# Patient Record
Sex: FEMALE | Race: WHITE | Employment: UNEMPLOYED | URBAN - METROPOLITAN AREA
[De-identification: names, ages, dates, MRNs, and addresses within clinical notes are randomized per-mention and may not be internally consistent; named-entity substitution may affect disease eponyms.]

---

## 2020-02-18 ENCOUNTER — HOSPITAL ENCOUNTER (INPATIENT)
Age: 65
LOS: 1 days | Discharge: HOME | DRG: 310 | End: 2020-02-19
Attending: EMERGENCY MEDICINE | Admitting: INTERNAL MEDICINE
Payer: COMMERCIAL

## 2020-02-18 ENCOUNTER — APPOINTMENT (OUTPATIENT)
Dept: GENERAL RADIOLOGY | Age: 65
DRG: 310 | End: 2020-02-18
Payer: COMMERCIAL

## 2020-02-18 ENCOUNTER — APPOINTMENT (OUTPATIENT)
Dept: ULTRASOUND IMAGING | Age: 65
DRG: 310 | End: 2020-02-18
Payer: COMMERCIAL

## 2020-02-18 PROBLEM — I47.10 PSVT (PAROXYSMAL SUPRAVENTRICULAR TACHYCARDIA): Status: ACTIVE | Noted: 2020-02-18

## 2020-02-18 PROBLEM — I48.91 NEW ONSET ATRIAL FIBRILLATION (HCC): Status: ACTIVE | Noted: 2020-02-18

## 2020-02-18 PROBLEM — I47.1 PSVT (PAROXYSMAL SUPRAVENTRICULAR TACHYCARDIA) (HCC): Status: ACTIVE | Noted: 2020-02-18

## 2020-02-18 LAB
A/G RATIO: 1.8 (ref 1.1–2.2)
ALBUMIN SERPL-MCNC: 4.6 G/DL (ref 3.4–5)
ALP BLD-CCNC: 40 U/L (ref 40–129)
ALT SERPL-CCNC: 11 U/L (ref 10–40)
ANION GAP SERPL CALCULATED.3IONS-SCNC: 16 MMOL/L (ref 3–16)
AST SERPL-CCNC: 20 U/L (ref 15–37)
BASOPHILS ABSOLUTE: 0.1 K/UL (ref 0–0.2)
BASOPHILS RELATIVE PERCENT: 0.8 %
BILIRUB SERPL-MCNC: 0.3 MG/DL (ref 0–1)
BUN BLDV-MCNC: 11 MG/DL (ref 7–20)
CALCIUM SERPL-MCNC: 9.4 MG/DL (ref 8.3–10.6)
CHLORIDE BLD-SCNC: 101 MMOL/L (ref 99–110)
CO2: 22 MMOL/L (ref 21–32)
CREAT SERPL-MCNC: 1.2 MG/DL (ref 0.6–1.2)
EKG ATRIAL RATE: 150 BPM
EKG DIAGNOSIS: NORMAL
EKG P AXIS: 74 DEGREES
EKG Q-T INTERVAL: 292 MS
EKG QRS DURATION: 62 MS
EKG QTC CALCULATION (BAZETT): 444 MS
EKG R AXIS: 34 DEGREES
EKG T AXIS: 260 DEGREES
EKG VENTRICULAR RATE: 139 BPM
EOSINOPHILS ABSOLUTE: 0 K/UL (ref 0–0.6)
EOSINOPHILS RELATIVE PERCENT: 0.5 %
GFR AFRICAN AMERICAN: 55
GFR NON-AFRICAN AMERICAN: 45
GLOBULIN: 2.6 G/DL
GLUCOSE BLD-MCNC: 134 MG/DL (ref 70–99)
HCT VFR BLD CALC: 38.4 % (ref 36–48)
HEMOGLOBIN: 13.1 G/DL (ref 12–16)
LYMPHOCYTES ABSOLUTE: 1.7 K/UL (ref 1–5.1)
LYMPHOCYTES RELATIVE PERCENT: 24.2 %
MCH RBC QN AUTO: 33.9 PG (ref 26–34)
MCHC RBC AUTO-ENTMCNC: 34 G/DL (ref 31–36)
MCV RBC AUTO: 99.7 FL (ref 80–100)
MONOCYTES ABSOLUTE: 0.3 K/UL (ref 0–1.3)
MONOCYTES RELATIVE PERCENT: 4.7 %
NEUTROPHILS ABSOLUTE: 5 K/UL (ref 1.7–7.7)
NEUTROPHILS RELATIVE PERCENT: 69.8 %
PDW BLD-RTO: 13.9 % (ref 12.4–15.4)
PLATELET # BLD: 259 K/UL (ref 135–450)
PMV BLD AUTO: 8.4 FL (ref 5–10.5)
POTASSIUM REFLEX MAGNESIUM: 4 MMOL/L (ref 3.5–5.1)
RBC # BLD: 3.85 M/UL (ref 4–5.2)
SODIUM BLD-SCNC: 139 MMOL/L (ref 136–145)
T4 FREE: <0.1 NG/DL (ref 0.9–1.8)
TOTAL PROTEIN: 7.2 G/DL (ref 6.4–8.2)
TROPONIN: <0.01 NG/ML
TSH SERPL DL<=0.05 MIU/L-ACNC: 134.7 UIU/ML (ref 0.27–4.2)
WBC # BLD: 7.1 K/UL (ref 4–11)

## 2020-02-18 PROCEDURE — 71045 X-RAY EXAM CHEST 1 VIEW: CPT

## 2020-02-18 PROCEDURE — 36415 COLL VENOUS BLD VENIPUNCTURE: CPT

## 2020-02-18 PROCEDURE — 6370000000 HC RX 637 (ALT 250 FOR IP): Performed by: INTERNAL MEDICINE

## 2020-02-18 PROCEDURE — 93010 ELECTROCARDIOGRAM REPORT: CPT | Performed by: INTERNAL MEDICINE

## 2020-02-18 PROCEDURE — 80053 COMPREHEN METABOLIC PANEL: CPT

## 2020-02-18 PROCEDURE — 2580000003 HC RX 258: Performed by: INTERNAL MEDICINE

## 2020-02-18 PROCEDURE — 96366 THER/PROPH/DIAG IV INF ADDON: CPT

## 2020-02-18 PROCEDURE — 93005 ELECTROCARDIOGRAM TRACING: CPT | Performed by: EMERGENCY MEDICINE

## 2020-02-18 PROCEDURE — 96365 THER/PROPH/DIAG IV INF INIT: CPT

## 2020-02-18 PROCEDURE — 83036 HEMOGLOBIN GLYCOSYLATED A1C: CPT

## 2020-02-18 PROCEDURE — 84443 ASSAY THYROID STIM HORMONE: CPT

## 2020-02-18 PROCEDURE — 2580000003 HC RX 258: Performed by: EMERGENCY MEDICINE

## 2020-02-18 PROCEDURE — 99255 IP/OBS CONSLTJ NEW/EST HI 80: CPT | Performed by: INTERNAL MEDICINE

## 2020-02-18 PROCEDURE — 2500000003 HC RX 250 WO HCPCS: Performed by: EMERGENCY MEDICINE

## 2020-02-18 PROCEDURE — 84484 ASSAY OF TROPONIN QUANT: CPT

## 2020-02-18 PROCEDURE — 84439 ASSAY OF FREE THYROXINE: CPT

## 2020-02-18 PROCEDURE — 6360000002 HC RX W HCPCS: Performed by: INTERNAL MEDICINE

## 2020-02-18 PROCEDURE — 2060000000 HC ICU INTERMEDIATE R&B

## 2020-02-18 PROCEDURE — 85025 COMPLETE CBC W/AUTO DIFF WBC: CPT

## 2020-02-18 PROCEDURE — 99285 EMERGENCY DEPT VISIT HI MDM: CPT

## 2020-02-18 PROCEDURE — 76536 US EXAM OF HEAD AND NECK: CPT

## 2020-02-18 PROCEDURE — 96375 TX/PRO/DX INJ NEW DRUG ADDON: CPT

## 2020-02-18 RX ORDER — SODIUM CHLORIDE 0.9 % (FLUSH) 0.9 %
10 SYRINGE (ML) INJECTION EVERY 12 HOURS SCHEDULED
Status: DISCONTINUED | OUTPATIENT
Start: 2020-02-18 | End: 2020-02-19 | Stop reason: HOSPADM

## 2020-02-18 RX ORDER — METOPROLOL TARTRATE 5 MG/5ML
5 INJECTION INTRAVENOUS EVERY 6 HOURS PRN
Status: DISCONTINUED | OUTPATIENT
Start: 2020-02-18 | End: 2020-02-19 | Stop reason: HOSPADM

## 2020-02-18 RX ORDER — ASPIRIN 81 MG/1
81 TABLET, CHEWABLE ORAL DAILY
Status: DISCONTINUED | OUTPATIENT
Start: 2020-02-18 | End: 2020-02-18

## 2020-02-18 RX ORDER — SODIUM CHLORIDE 0.9 % (FLUSH) 0.9 %
10 SYRINGE (ML) INJECTION PRN
Status: DISCONTINUED | OUTPATIENT
Start: 2020-02-18 | End: 2020-02-19 | Stop reason: HOSPADM

## 2020-02-18 RX ORDER — 0.9 % SODIUM CHLORIDE 0.9 %
1000 INTRAVENOUS SOLUTION INTRAVENOUS ONCE
Status: COMPLETED | OUTPATIENT
Start: 2020-02-18 | End: 2020-02-18

## 2020-02-18 RX ORDER — METOPROLOL SUCCINATE 25 MG/1
25 TABLET, EXTENDED RELEASE ORAL DAILY
Status: DISCONTINUED | OUTPATIENT
Start: 2020-02-18 | End: 2020-02-19

## 2020-02-18 RX ORDER — METOPROLOL TARTRATE 5 MG/5ML
5 INJECTION INTRAVENOUS ONCE
Status: COMPLETED | OUTPATIENT
Start: 2020-02-18 | End: 2020-02-18

## 2020-02-18 RX ORDER — ONDANSETRON 2 MG/ML
4 INJECTION INTRAMUSCULAR; INTRAVENOUS EVERY 6 HOURS PRN
Status: DISCONTINUED | OUTPATIENT
Start: 2020-02-18 | End: 2020-02-19 | Stop reason: HOSPADM

## 2020-02-18 RX ORDER — SODIUM CHLORIDE 9 MG/ML
1000 INJECTION, SOLUTION INTRAVENOUS CONTINUOUS
Status: DISCONTINUED | OUTPATIENT
Start: 2020-02-18 | End: 2020-02-19 | Stop reason: HOSPADM

## 2020-02-18 RX ORDER — LEVOTHYROXINE SODIUM 0.05 MG/1
50 TABLET ORAL DAILY
Status: DISCONTINUED | OUTPATIENT
Start: 2020-02-18 | End: 2020-02-19 | Stop reason: HOSPADM

## 2020-02-18 RX ORDER — DILTIAZEM HYDROCHLORIDE 5 MG/ML
10 INJECTION INTRAVENOUS ONCE
Status: COMPLETED | OUTPATIENT
Start: 2020-02-18 | End: 2020-02-18

## 2020-02-18 RX ADMIN — DILTIAZEM HYDROCHLORIDE 10 MG: 5 INJECTION INTRAVENOUS at 11:40

## 2020-02-18 RX ADMIN — DILTIAZEM HYDROCHLORIDE 10 MG/HR: 5 INJECTION INTRAVENOUS at 11:38

## 2020-02-18 RX ADMIN — Medication 10 ML: at 19:53

## 2020-02-18 RX ADMIN — LEVOTHYROXINE SODIUM 50 MCG: 50 TABLET ORAL at 14:42

## 2020-02-18 RX ADMIN — ASPIRIN 81 MG 81 MG: 81 TABLET ORAL at 14:42

## 2020-02-18 RX ADMIN — SODIUM CHLORIDE 1000 ML: 9 INJECTION, SOLUTION INTRAVENOUS at 10:36

## 2020-02-18 RX ADMIN — ENOXAPARIN SODIUM 40 MG: 40 INJECTION SUBCUTANEOUS at 20:00

## 2020-02-18 RX ADMIN — METOPROLOL TARTRATE 5 MG: 1 INJECTION, SOLUTION INTRAVENOUS at 10:36

## 2020-02-18 RX ADMIN — SODIUM CHLORIDE 1000 ML: 9 INJECTION, SOLUTION INTRAVENOUS at 11:58

## 2020-02-18 ASSESSMENT — ENCOUNTER SYMPTOMS
COUGH: 0
DIARRHEA: 0
WHEEZING: 0
EYE PAIN: 0
SHORTNESS OF BREATH: 0
NAUSEA: 0
BACK PAIN: 0
SORE THROAT: 0
RHINORRHEA: 0
VOMITING: 0
EYE DISCHARGE: 0
ABDOMINAL PAIN: 0

## 2020-02-18 ASSESSMENT — PAIN SCALES - GENERAL
PAINLEVEL_OUTOF10: 0

## 2020-02-18 NOTE — H&P
0. 3   ALKPHOS 40     COAG  No results for input(s): INR in the last 72 hours. CARDIAC ENZYMES  Recent Labs     02/18/20  1025   TROPONINI <0.01       U/A:  No results found for: NITRITE, COLORU, WBCUA, RBCUA, MUCUS, BACTERIA, CLARITYU, SPECGRAV, LEUKOCYTESUR, BLOODU, GLUCOSEU, AMORPHOUS    ABG  No results found for: SND6FOX, BEART, M4ITQKTN, PHART, THGBART, EIQ1USG, PO2ART, GZN8HUE        Active Hospital Problems    Diagnosis Date Noted    New onset atrial fibrillation [facility]) [I48.91] 02/18/2020         PHYSICIANS CERTIFICATION:    I certify that Reggie Pereyra is expected to be hospitalized for more than 2 midnights based on the following assessment and plan:      ASSESSMENT/PLAN:      New onset atrial fibrillation with RVR  -continue diltiazem gtt with goal HR < 110  -EP consulted, recs appreciated  -CHADSVASC score of 1 (female sex): have started the patient on daily aspirin  -Echocardiogram ordered and pending  -tele monitoring  -troponin negative  -thyroid studies indicative of primary hypothyroidism    Severe primary hypothyroidism - significantly elevated TSH and low free T4  -start PO levothyroxine 50 mcg/daily  -check thyroid ultrasound  -patient will need a repeat TSH in ~6 weeks with PCP at discharge    Hyperglycemia  -continue to monitor  -check Hgb A1c    CKD stage III - unclear baseline   -continue to monitor        DVT Prophylaxis: Lovenox  Diet: DIET CARDIAC; Low Sodium (2 GM)  Code Status: Full Code  PT/OT Eval Status: defer    Dispo - admit PCU tele inpatient       Amor Park MD    Thank you No primary care provider on file. for the opportunity to be involved in this patient's care. If you have any questions or concerns please feel free to contact me at [phone].

## 2020-02-18 NOTE — ED NOTES
Bed: B-10  Expected date: 2/18/20  Expected time:   Means of arrival:   Comments:  Increased heart rate      Ilan Stout RN  02/18/20 1011

## 2020-02-18 NOTE — CONSULTS
diltiazem drip and change to an oral beta-blocker. Echocardiogram is ordered. Continue to monitor on telemetry. 2) Hypothyroidism. Will defer management to primary team.  More commonly associated with bradyarrhythmias than tachycardia. 3) Hypotension. May be related to IV diltiazem. Agree with IV fluid resuscitation. Will defer to primary team if infectious work-up is necessary as the patient assumed she had the flu. Overall, the problems requiring hospitalization are high in severity. Thank you for allowing us to participate in the care of [facility]. Cherie Valdes.  [facility], [address]  2/18/2020 3:24 PM

## 2020-02-18 NOTE — PROGRESS NOTES
[facility] notified to update Dr uG Rater on pt`s cardizem gtt and BP.  Electronically signed by Angel Sahu RN on 2/18/2020 at 4:19 PM

## 2020-02-18 NOTE — ED PROVIDER NOTES
[facility]  eMERGENCY dEPARTMENT eNCOUnter        Pt Name: Candace Cotton  MRN: [id number]  [place] 1955  Date of evaluation: 2/18/2020  Provider: Marcin Terrell MD  PCP: No primary care provider on file. CHIEF COMPLAINT       Chief Complaint   Patient presents with    Tachycardia      per EMS, no hx started this AM    Dizziness       HISTORY OFPRESENT ILLNESS   (Location/Symptom, Timing/Onset, Context/Setting, Quality, Duration, Modifying Factors,Severity)  Note limiting factors. Candace Cotton is a 60 y.o. female       Location/Symptom: Lightheadedness  Timing/Onset: Around 36 this morning when she woke up  Context/Setting: Fairly healthy 53-year-old female without chronic illnesses. No substance abuse. She was just lying in bed after having woken up and started feeling lightheaded. She really did not feel her heart beating funny. No chest pain or shortness of breath. She just felt like she was going to pass out  Quality: She does not describe vertigo. Duration: About 3 hours  Modifying Factors: None. She does come in by ambulance and they noted her to be in a tachyarrhythmia  Severity: Currently no pain or pressure in her chest.    Nursing Noteswere all reviewed and agreed with or any disagreements were addressed  in the HPI. REVIEW OF SYSTEMS    (2-9 systems for level 4, 10 or more for level 5)     Review of Systems   Constitutional: Positive for fatigue. Negative for chills and fever. HENT: Negative for ear pain, rhinorrhea and sore throat. Eyes: Negative for pain, discharge and visual disturbance. Respiratory: Negative for cough, shortness of breath and wheezing. Cardiovascular: Negative for chest pain, palpitations and leg swelling. Gastrointestinal: Negative for abdominal pain, diarrhea, nausea and vomiting. Genitourinary: Negative for difficulty urinating, dysuria, pelvic pain and vaginal discharge. Emotionally abused: None     Physically abused: None     Forced sexual activity: None   Other Topics Concern    None   Social History Narrative    None       SCREENINGS             PHYSICAL EXAM    (up to 7 for level 4, 8 or more for level 5)     ED Triage Vitals   BP Temp Temp src Pulse Resp SpO2 Height Weight   -- -- -- -- -- -- -- --      height is 5' 1.5\" (1.562 m) and weight is 155 lb 3.3 oz (70.4 kg). Her temperature is 98.9 °F (37.2 °C). Her blood pressure is 100/76 and her pulse is 141. Her respiration is 14 and oxygen saturation is 95%. Physical Exam  Constitutional:       Appearance: She is well-developed. She is not diaphoretic. HENT:      Head: Normocephalic and atraumatic. Right Ear: External ear normal.      Left Ear: External ear normal.   Eyes:      General: No scleral icterus. Right eye: No discharge. Left eye: No discharge. Neck:      Musculoskeletal: Normal range of motion. Thyroid: No thyromegaly. Vascular: No JVD. Trachea: No tracheal deviation. Cardiovascular:      Rate and Rhythm: Tachycardia present. Rhythm irregularly irregular. Heart sounds: Normal heart sounds. No murmur. No friction rub. No gallop. Pulmonary:      Effort: Pulmonary effort is normal. No respiratory distress. Breath sounds: Normal breath sounds. No stridor. No wheezing or rales. Abdominal:      General: There is no distension. Palpations: Abdomen is soft. Tenderness: There is no abdominal tenderness. There is no guarding or rebound. Musculoskeletal:         General: No tenderness. Skin:     General: Skin is warm and dry. Findings: No rash (On exposed body surfaces). Neurological:      Mental Status: She is alert and oriented to person, place, and time. Coordination: Coordination normal.   Psychiatric:         Behavior: Behavior normal.         Thought Content:  Thought content normal.         DIAGNOSTIC RESULTS   LABS:    Results for orders placed or performed during the hospital encounter of 02/18/20   CBC Auto Differential   Result Value Ref Range    WBC 7.1 4.0 - 11.0 K/uL    RBC 3.85 (L) 4.00 - 5.20 M/uL    Hemoglobin 13.1 12.0 - 16.0 g/dL    Hematocrit 38.4 36.0 - 48.0 %    MCV 99.7 80.0 - 100.0 fL    MCH 33.9 26.0 - 34.0 pg    MCHC 34.0 31.0 - 36.0 g/dL    RDW 13.9 12.4 - 15.4 %    Platelets 690 089 - 439 K/uL    MPV 8.4 5.0 - 10.5 fL    Neutrophils % 69.8 %    Lymphocytes % 24.2 %    Monocytes % 4.7 %    Eosinophils % 0.5 %    Basophils % 0.8 %    Neutrophils Absolute 5.0 1.7 - 7.7 K/uL    Lymphocytes Absolute 1.7 1.0 - 5.1 K/uL    Monocytes Absolute 0.3 0.0 - 1.3 K/uL    Eosinophils Absolute 0.0 0.0 - 0.6 K/uL    Basophils Absolute 0.1 0.0 - 0.2 K/uL   Comprehensive Metabolic Panel w/ Reflex to MG   Result Value Ref Range    Sodium 139 136 - 145 mmol/L    Potassium reflex Magnesium 4.0 3.5 - 5.1 mmol/L    Chloride 101 99 - 110 mmol/L    CO2 22 21 - 32 mmol/L    Anion Gap 16 3 - 16    Glucose 134 (H) 70 - 99 mg/dL    BUN 11 7 - 20 mg/dL    CREATININE 1.2 0.6 - 1.2 mg/dL    GFR Non-African American 45 (A) >60    GFR  55 (A) >60    Calcium 9.4 8.3 - 10.6 mg/dL    Total Protein 7.2 6.4 - 8.2 g/dL    Alb 4.6 3.4 - 5.0 g/dL    Albumin/Globulin Ratio 1.8 1.1 - 2.2    Total Bilirubin 0.3 0.0 - 1.0 mg/dL    Alkaline Phosphatase 40 40 - 129 U/L    ALT 11 10 - 40 U/L    AST 20 15 - 37 U/L    Globulin 2.6 g/dL   Troponin   Result Value Ref Range    Troponin <0.01 <0.01 ng/mL   TSH without Reflex   Result Value Ref Range    .70 (H) 0.27 - 4.20 uIU/mL   EKG 12 Lead   Result Value Ref Range    Ventricular Rate 139 BPM    Atrial Rate 150 BPM    QRS Duration 62 ms    Q-T Interval 292 ms    QTc Calculation (Bazett) 444 ms    P Axis 74 degrees    R Axis 34 degrees    T Axis 260 degrees    Diagnosis       Undetermined rhythmNonspecific ST and T wave abnormalityAbnormal ECGNo previous ECGs available       All other labs were within normal range or not returned as of this dictation. EKG: All EKG's are interpreted by the Emergency Department Physician who either signs orCo-signs this chart in the absence of a cardiologist.    EKG visualized preliminarily interpreted by myself showing narrow complex irregular tachycardia at a rate of 139. Axis of 34. Typical nonspecific ST-T wave findings. Consistent with atrial fibrillation. However she does have a few sinus rhythm beats on this EKG. RADIOLOGY:   Non-plain film images such as CT, Ultrasound and MRI are read by the radiologist. Gabriel Turner radiographic images are visualized and preliminarily interpreted by the  EDProvider with the below findings:    Xr Chest Portable    Result Date: 2/18/2020  EXAMINATION: ONE XRAY VIEW OF THE CHEST 2/18/2020 10:32 am COMPARISON: None available. HISTORY: ORDERING SYSTEM PROVIDED HISTORY: afib rvr TECHNOLOGIST PROVIDED HISTORY: Reason for exam:->afib rvr Reason for Exam: afib rvr Acuity: Acute Type of Exam: Initial FINDINGS: The lungs are clear. The cardiac silhouette is within normal limits. There is no pneumothorax or pleural effusion. 1.  No acute abnormality. PROCEDURES   Unless otherwise noted below, none     Procedures    CRITICAL CARE TIME   CRITICAL CARE: There was a high probability of clinically significant/life threatening deterioration in this patient's condition which required my urgent intervention. Patient was an abnormal cardiac rhythm requiring intravenous medications. Total critical care time was 30 minutes. This excludes any time for separately reportable procedures.        CONSULTS:  None    EMERGENCY DEPARTMENT COURSE and DIFFERENTIAL DIAGNOSIS/MDM:   Vitals:    Vitals:    02/18/20 1021 02/18/20 1030 02/18/20 1047 02/18/20 1101   BP: 131/67 131/67 (!) 115/97 100/76   Pulse: 143 135 114 141   Resp: 14 12 14 14   Temp: 98.9 °F (37.2 °C)      SpO2: 99% 100% 100% 95%   Weight: 155 lb 3.3 oz (70.4 kg)      Height: 5' 1.5\" (1.562 m)          Patient was given the following medications:  Medications   diltiazem injection 10 mg (10 mg Intravenous Given 2/18/20 1140)     Followed by   diltiazem 125 mg in dextrose 5 % 125 mL infusion (10 mg/hr Intravenous New Bag 2/18/20 1138)   0.9 % sodium chloride infusion (has no administration in time range)   0.9 % sodium chloride bolus (0 mLs Intravenous Stopped 2/18/20 1158)   metoprolol (LOPRESSOR) injection 5 mg (5 mg Intravenous Given 2/18/20 1036)       She seemed to be competing between sinus rhythm and the tachyarrhythmia so, I did try a dose of Lopressor. It kept her from going up into the 170s but she is remained in A. fib with a rapid response so I then placed her on Cardizem. FINAL IMPRESSION      1. Atrial fibrillation with rapid ventricular response ([facility] [address]          DISPOSITION/PLAN    DISPOSITION Decision To Admit 02/18/2020 11:58:11 AM      PATIENT REFERRED TO:  No follow-up provider specified.     DISCHARGE MEDICATIONS:  New Prescriptions    No medications on file       DISCONTINUED MEDICATIONS:  Discontinued Medications    No medications on file              (Please note that portions of this note were completed with a voice recognition program.  Efforts were made to editthe dictations but occasionally words are mis-transcribed.)    Orly Sher MD (electronically signed)            Orly Sher MD  02/18/20 4410

## 2020-02-19 VITALS
RESPIRATION RATE: 16 BRPM | TEMPERATURE: 97.9 F | SYSTOLIC BLOOD PRESSURE: 107 MMHG | HEIGHT: 62 IN | BODY MASS INDEX: 28.36 KG/M2 | WEIGHT: 154.1 LBS | OXYGEN SATURATION: 98 % | HEART RATE: 52 BPM | DIASTOLIC BLOOD PRESSURE: 70 MMHG

## 2020-02-19 LAB
A/G RATIO: 2.1 (ref 1.1–2.2)
ALBUMIN SERPL-MCNC: 3.9 G/DL (ref 3.4–5)
ALP BLD-CCNC: 33 U/L (ref 40–129)
ALT SERPL-CCNC: 8 U/L (ref 10–40)
ANION GAP SERPL CALCULATED.3IONS-SCNC: 11 MMOL/L (ref 3–16)
AST SERPL-CCNC: 15 U/L (ref 15–37)
BASOPHILS ABSOLUTE: 0.1 K/UL (ref 0–0.2)
BASOPHILS RELATIVE PERCENT: 1.1 %
BILIRUB SERPL-MCNC: 0.3 MG/DL (ref 0–1)
BUN BLDV-MCNC: 12 MG/DL (ref 7–20)
CALCIUM SERPL-MCNC: 8.3 MG/DL (ref 8.3–10.6)
CHLORIDE BLD-SCNC: 109 MMOL/L (ref 99–110)
CHOLESTEROL, TOTAL: 258 MG/DL (ref 0–199)
CO2: 21 MMOL/L (ref 21–32)
CORTISOL TOTAL: 14.5 UG/DL
CREAT SERPL-MCNC: 1.2 MG/DL (ref 0.6–1.2)
EOSINOPHILS ABSOLUTE: 0 K/UL (ref 0–0.6)
EOSINOPHILS RELATIVE PERCENT: 0.7 %
ESTIMATED AVERAGE GLUCOSE: 116.9 MG/DL
GFR AFRICAN AMERICAN: 55
GFR NON-AFRICAN AMERICAN: 45
GLOBULIN: 1.9 G/DL
GLUCOSE BLD-MCNC: 96 MG/DL (ref 70–99)
HBA1C MFR BLD: 5.7 %
HCT VFR BLD CALC: 33 % (ref 36–48)
HDLC SERPL-MCNC: 70 MG/DL (ref 40–60)
HEMOGLOBIN: 11.1 G/DL (ref 12–16)
INR BLD: 1.2 (ref 0.86–1.14)
LDL CHOLESTEROL CALCULATED: 175 MG/DL
LV EF: 60 %
LVEF MODALITY: NORMAL
LYMPHOCYTES ABSOLUTE: 2.2 K/UL (ref 1–5.1)
LYMPHOCYTES RELATIVE PERCENT: 40 %
MCH RBC QN AUTO: 33.9 PG (ref 26–34)
MCHC RBC AUTO-ENTMCNC: 33.6 G/DL (ref 31–36)
MCV RBC AUTO: 101 FL (ref 80–100)
MONOCYTES ABSOLUTE: 0.3 K/UL (ref 0–1.3)
MONOCYTES RELATIVE PERCENT: 6 %
NEUTROPHILS ABSOLUTE: 2.9 K/UL (ref 1.7–7.7)
NEUTROPHILS RELATIVE PERCENT: 52.2 %
PDW BLD-RTO: 14.1 % (ref 12.4–15.4)
PLATELET # BLD: 223 K/UL (ref 135–450)
PMV BLD AUTO: 8.2 FL (ref 5–10.5)
POTASSIUM REFLEX MAGNESIUM: 4.2 MMOL/L (ref 3.5–5.1)
PROTHROMBIN TIME: 14 SEC (ref 10–13.2)
RBC # BLD: 3.26 M/UL (ref 4–5.2)
SODIUM BLD-SCNC: 141 MMOL/L (ref 136–145)
TOTAL PROTEIN: 5.8 G/DL (ref 6.4–8.2)
TRIGL SERPL-MCNC: 65 MG/DL (ref 0–150)
VLDLC SERPL CALC-MCNC: 13 MG/DL
WBC # BLD: 5.6 K/UL (ref 4–11)

## 2020-02-19 PROCEDURE — 85025 COMPLETE CBC W/AUTO DIFF WBC: CPT

## 2020-02-19 PROCEDURE — 93306 TTE W/DOPPLER COMPLETE: CPT

## 2020-02-19 PROCEDURE — 82533 TOTAL CORTISOL: CPT

## 2020-02-19 PROCEDURE — 99232 SBSQ HOSP IP/OBS MODERATE 35: CPT | Performed by: INTERNAL MEDICINE

## 2020-02-19 PROCEDURE — 2580000003 HC RX 258: Performed by: INTERNAL MEDICINE

## 2020-02-19 PROCEDURE — 80061 LIPID PANEL: CPT

## 2020-02-19 PROCEDURE — 6370000000 HC RX 637 (ALT 250 FOR IP): Performed by: INTERNAL MEDICINE

## 2020-02-19 PROCEDURE — 36415 COLL VENOUS BLD VENIPUNCTURE: CPT

## 2020-02-19 PROCEDURE — 80053 COMPREHEN METABOLIC PANEL: CPT

## 2020-02-19 PROCEDURE — 85610 PROTHROMBIN TIME: CPT

## 2020-02-19 RX ORDER — LEVOTHYROXINE SODIUM 0.05 MG/1
50 TABLET ORAL DAILY
Qty: 30 TABLET | Refills: 3 | Status: SHIPPED | OUTPATIENT
Start: 2020-02-20 | End: 2020-05-14 | Stop reason: SDUPTHER

## 2020-02-19 RX ADMIN — LEVOTHYROXINE SODIUM 50 MCG: 50 TABLET ORAL at 06:33

## 2020-02-19 RX ADMIN — SODIUM CHLORIDE 1000 ML: 9 INJECTION, SOLUTION INTRAVENOUS at 11:42

## 2020-02-19 ASSESSMENT — PAIN SCALES - GENERAL: PAINLEVEL_OUTOF10: 0

## 2020-02-19 NOTE — PROGRESS NOTES
[address]   Daily Progress Note      Admit Date:  2/18/2020    Reason for initial consultation: PSVT    CC: \"I feel normal\"    HPI:  Jeanna Esparza is a 60 y.o. patient who presented to the hospital with complaints of malaise, nausea, and lightheadedness. She endorsed generalized fatigue for quite some time but awoke this morning feeling acutely ill. She said she had the flu several years ago and felt very similarly. She denied chest pains, palpitations, or shortness of breath. She was feeling more lightheaded and nauseous so she sought medical attention. In the emergency department she was found to be tachycardic which was believed to be atrial fibrillation. However, there appears to be P waves throughout the ECG. She denies a history of cardiac issues or any chronic medical condition in general.  She was found to be hypothyroid with a TSH of 134. Her pulse is still elevated but she says she is feeling better after starting IV fluids. Patient denies exertional chest pain/pressure, dyspnea at rest, PND, orthopnea, palpitations, weight changes,  LE edema, and syncope. Interval History:  Pt with no acute overnight events. Denies chest pain, palpitations, lightheadedness, and dyspnea. Tolerating po.  present bedside. Hopes to go home. Review of Systems:   · Constitutional: No unanticipated weight loss. There's been no change in energy level, sleep pattern, or activity level. No fevers, chills. · Eyes: No visual changes or diplopia. No scleral icterus. · ENT: No Headaches, hearing loss or vertigo. No mouth sores or sore throat. · Cardiovascular: as reviewed in HPI  · Respiratory: No cough or wheezing, no sputum production. No hemoptysis. · Gastrointestinal: No abdominal pain, appetite loss, blood in stools. No change in bowel or bladder habits. · Genitourinary: No dysuria, trouble voiding, or hematuria.   · Musculoskeletal:  No gait disturbance, no joint complaints. · Integumentary: No rash or pruritis. · Neurological: No headache, diplopia, change in muscle strength, numbness or tingling. · Psychiatric: No anxiety or depression. · Endocrine: No temperature intolerance. No excessive thirst, fluid intake, or urination. No tremor. · Hematologic/Lymphatic: No abnormal bruising or bleeding, blood clots or swollen lymph nodes. · Allergic/Immunologic: No nasal congestion or hives. Objective:   /70   Pulse 52   Temp 97.9 °F (36.6 °C) (Oral)   Resp 16   Ht 5' 1.5\" (1.562 m)   Wt 154 lb 1.6 oz (69.9 kg)   SpO2 98%   BMI 28.65 kg/m²       Intake/Output Summary (Last 24 hours) at 2/19/2020 1509  Last data filed at 2/19/2020 0913  Gross per 24 hour   Intake 720 ml   Output 0 ml   Net 720 ml     Wt Readings from Last 3 Encounters:   02/19/20 154 lb 1.6 oz (69.9 kg)   07/24/14 145 lb (65.8 kg)   07/22/14 145 lb (65.8 kg)       Physical Exam:  General:  NAD. Eyes: Pupils equal and round. EOMI. No icterus. Skin:  Warm and dry. No new appearing rashes or lesions. Neck:  Supple. No JVP or bruit appreciated. Respiratory:  No respiratory distress. Clear to auscultation. No wheezes/rhonchi/rales  Cardiovascular:  Regular rate~60. S1S2. No M/R/G. Abdomen:  Soft, nontender, +bowel sounds. MSK:  No LE edema. No clubbing or cyanosis. Moves all extremities. Psych: Normal insight and judgement. Awake, alert, and oriented X4.     Medications:    sodium chloride flush  10 mL Intravenous 2 times per day    enoxaparin  40 mg Subcutaneous Nightly    levothyroxine  50 mcg Oral Daily    metoprolol succinate  25 mg Oral Daily      sodium chloride 1,000 mL (02/19/20 1142)     sodium chloride flush, magnesium hydroxide, ondansetron, perflutren lipid microspheres, metoprolol    Lab Data:  CBC:   Recent Labs     02/18/20  1025 02/19/20  0523   WBC 7.1 5.6   HGB 13.1 11.1*    223     BMP:    Recent Labs     02/18/20  1025 02/19/20  0523    141   K 4.0 4.2   CO2 22 21   BUN 11 12   CREATININE 1.2 1.2     LIVR:   Recent Labs     02/18/20  1025 02/19/20  0523   AST 20 15   ALT 11 8*     INR:    Recent Labs     02/19/20  0523   INR 1.20*     APTT: No results for input(s): APTT in the last 72 hours. BNP:  No results for input(s): BNP in the last 72 hours. Telemetry: sinus bradycardia. Assessment/Plan:    1) PSVT (atrial tachycardia). Uncertain if she is truly symptomatic from the rhythm as she was in arrhythmia yesterday but generally said she is felt back to normal.  Regardless, I do not believe at this point there is documentation of atrial fibrillation. Echocardiogram completed. Will arrange outpatient follow-up and will likely suggest an event monitor when thyroid function normalized.    2) Hypothyroidism. Will defer management to primary team.  More commonly associated with bradyarrhythmias than tachycardia but regardless would not pursue ablation until thyroid function normal.      3) Hypotension. Asymptomatic. Will d/c B-blocker as she is also bradycardic. 4) Dyslipidemia. May be related to hypothyroidism. Repeat lipid panel when TSH/free T4 normalized. Will sign off. Call with questions. Will arrange outpatient follow-up.      Electronically signed by Zachary Williamson MD on 2/19/2020 at 3:09 PM

## 2020-02-19 NOTE — DISCHARGE SUMMARY
Hospital Medicine Discharge Summary    Patient ID: Rony Parra      Patient's PCP: No primary care provider on file. Admit Date: 2/18/2020     Discharge Date:   2/19/2020    Admitting Physician: Valdemar Wilkinson MD     Discharge Physician: Valdemar Wilkinson MD     Discharge Diagnoses: Active Hospital Problems    Diagnosis Date Noted    PSVT (paroxysmal supraventricular tachycardia) (HCC) [I47.1] 02/18/2020    Arterial hypotension [I95.9]     Acquired hypothyroidism [E03.9]        The patient was seen and examined on day of discharge and this discharge summary is in conjunction with any daily progress note from day of discharge. Hospital Course: The patient is a 60 y.o. female with no significant past medical history who presents to [facility] with lightheadedness and dizziness. Patient states this morning around 7AM, she became suddenly lightheaded and dizzy. She stated that it happened about the time she woke up. She was lying in bed at the time. Denies fever, chills, chest pain, palpitations, abdominal pain, nausea, vomiting, constipation, diarrhea, and dysuria.     In the ED, her EKG showed concern for atrial fibrillation with RVR and her heart rate on telemetry was as high as 170s. She was given one dose of IV metoprolol without significant change. She was then started on a diltiazem gtt. Labs were otherwise remarkable for a TSH level of 134.7 and a free T4 level <0.1.       Paroxysmal supraventricular tachycardia  -initially given dilt bolus and placed on diltiazem gtt with goal HR < 110 due to initial concern for Afib with RVR  -cardiology consulted, recs appreciated; after reviewing EKG and telemetry, the rhythm appeared more like SVT; dilt gtt was stopped and patient started on PO Toprol XL  -Echocardiogram ordered and had no significant findings  -tele monitoring  -troponin negative  -thyroid studies indicative of primary hypothyroidism  -patient will follow-up with cardiology at discharge     Primary hypothyroidism - significantly elevated TSH and low free T4  -start PO levothyroxine 50 mcg/daily  -thyroid ultrasound showed nonspecific heterogeneous atrophic thyroid gland  -patient will need a repeat TSH in ~6 weeks with PCP at discharge; order placed     Hyperglycemia - prediabetis  -continue to monitor  -Hgb A1c 5.7%     CKD stage III - unclear baseline   -continue to monitor    Dyslipidemia  -repeat as outpatient  -no acute indication to start statin therapy      Exam:     /70   Pulse 52   Temp 97.9 °F (36.6 °C) (Oral)   Resp 16   Ht 5' 1.5\" (1.562 m)   Wt 154 lb 1.6 oz (69.9 kg)   SpO2 98%   BMI 28.65 kg/m²       General appearance:  No apparent distress, appears stated age and cooperative. HEENT:  Normal cephalic, atraumatic without obvious deformity. Pupils equal, round, and reactive to light. Extra ocular muscles intact. Conjunctivae/corneas clear. Neck: Supple, with full range of motion. No jugular venous distention. Trachea midline. Respiratory:  Normal respiratory effort. Clear to auscultation, bilaterally without Rales/Wheezes/Rhonchi. Cardiovascular:  Regular rate and rhythm with normal S1/S2 without murmurs, rubs or gallops. Abdomen: Soft, non-tender, non-distended with normal bowel sounds. Musculoskeletal:  No clubbing, cyanosis or edema bilaterally. Full range of motion without deformity. Skin: Skin color, texture, turgor normal.  No rashes or lesions. Neurologic:  Neurovascularly intact without any focal sensory/motor deficits. Cranial nerves: II-XII intact, grossly non-focal.  Psychiatric:  Alert and oriented, thought content appropriate, normal insight  Capillary Refill: Brisk,< 3 seconds   Peripheral Pulses: +2 palpable, equal bilaterally       Labs:  For convenience and continuity at follow-up the following most recent labs are provided:      CBC:    Lab Results   Component Value Date    WBC 5.6 02/19/2020    HGB 11.1 02/19/2020    HCT 33.0 02/19/2020     02/19/2020       Renal:    Lab Results   Component Value Date     02/19/2020    K 4.2 02/19/2020     02/19/2020    CO2 21 02/19/2020    BUN 12 02/19/2020    CREATININE 1.2 02/19/2020    CALCIUM 8.3 02/19/2020         Significant Diagnostic Studies    Radiology:   US THYROID   Final Result   1. Nonspecific heterogeneous atrophic thyroid gland. XR CHEST PORTABLE   Final Result   1. No acute abnormality. Consults:     IP CONSULT TO CARDIOLOGY    Disposition:  home     Condition at Discharge: Stable    Discharge Instructions/Follow-up:  Repeat TSH in ~6 weeks, establish PCP, follow-up with cardiology    Code Status:  Full Code     Activity: activity as tolerated    Diet: regular diet      Discharge Medications:     Current Discharge Medication List           Details   levothyroxine (SYNTHROID) 50 MCG tablet Take 1 tablet by mouth Daily  Qty: 30 tablet, Refills: 3             Time Spent on discharge is more than 30 minutes in the examination, evaluation, counseling and review of medications and discharge plan. Signed:    Patrizia Reyna MD   2/19/2020      Thank you No primary care provider on file. for the opportunity to be involved in this patient's care. If you have any questions or concerns please feel free to contact me at [phone].

## 2020-02-19 NOTE — PLAN OF CARE
Problem: Falls - Risk of:  Goal: Will remain free from falls  Description  Will remain free from falls  Outcome: Ongoing  Note:   Fall risk assessment completed every shift. All precautions in place. Pt has call light within reach at all times. Room clear of clutter. Pt aware to call for assistance when getting up. Patient assessed for fall risk; fall precautions initiated. Patient and family instructed about safety devices. Environment kept free of clutter and adequate lighting provided. Bed locked and in lowest position. Call light within reach. Will continue to monitor.       Problem: PAIN  Goal: Patient's pain/discomfort is manageable  Outcome: Ongoing     Problem: Cardiac Output - Decreased:  Goal: Hemodynamic stability will improve  Description  Hemodynamic stability will improve  Outcome: Ongoing

## 2020-02-19 NOTE — PROGRESS NOTES
Dr Hazel Turcios and Cardiology notified of pts HR and BP being low and holding of BP medications.  Electronically signed by Yennifer Daigle RN on 2/19/2020 at 9:30 AM

## 2020-02-19 NOTE — PROGRESS NOTES
Discharge instructions reviewed with pt and family. Spoke with them about medications, follow up appts, and need to get thyroid level rechecked/PCP appt. Both verbalized understanding. Pt ambulated from [facility].  Electronically signed by Saintclair Patch, RN on 2/19/2020 at 4:34 PM

## 2020-03-25 ENCOUNTER — TELEPHONE (OUTPATIENT)
Dept: CARDIOLOGY CLINIC | Age: 65
End: 2020-03-25

## 2020-05-13 NOTE — PROGRESS NOTES
[address] [employer] VIRTUAL/VIDEO VISIT      TELEHEALTH EVALUATION -- Audio/Visual (During FHFIW-26 public health emergency)    Lidia Lloyd  1955    May 14, 2020    CC:   Chief Complaint   Patient presents with    Follow-Up from [facility]     ED to [address] [place] [address] 2/19/20 @ [facility] ~ A-FIB and CP  /  PSVT, arterial hypotention  /  no cardiac complaints today    Medication Refill     pt was given Synthroid in the hospital and now needs a refill     HPI:  The patient is 60 y.o. female with a past medical history significant for PSVT, hypothyroidism, hyperlipidemia who is here for hospital follow up. She presented to [facility] in 2/2020 with PSVT (atrial tachycardia) and seen in consult by Dr. Janell Johnson. He did not see any atrial fibrillation. Plan was for outpt follow up and placement of event monitor once thyroid normalized. Beta blocker was discontinued d/t sinus bradycardia and hypotension. Overall feeling fairly well. Denies chest pain/discomfort, SOB, orthopnea/PND, cough, palpitations, dizziness, syncope, edema , weight change or claudication. Has not followed up with PCP as of yet. Has not had repeat thyroid levels. Walks 1.5-2 miles few days a week. Active babysitting 3 grandchildren few days a week. Nonsmoker. Review of System:    · Constitutional: No fevers, chills. · Eyes: No visual changes or diplopia. No scleral icterus. · ENT: No Headaches. No mouth sores or sore throat. · Cardiovascular:  No for chest pain, No for dyspnea on exertion, No for palpitations or No for loss of consciousness. No cough, hemoptysis, No for pleuritic pain, or phlebitis. · Respiratory: No for cough or wheezing. No hematemesis. · Gastrointestinal: No abdominal pain, blood in stools. · Genitourinary: No dysuria, or hematuria. · Musculoskeletal: No gait disturbance,    · Integumentary: No rash or pruritis. · Neurological: No headache, change in muscle strength, numbness or tingling.    · Psychiatric: No anxiety, or Musculoskeletal:   [x] Normal range of motion of neck        [] Abnormal-       Neurological:        [x] No Facial Asymmetry (Cranial nerve 7 motor function) (limited exam to video visit)          [x] No gaze palsy        [] Abnormal-         Skin:        [x] No significant exanthematous lesions or discoloration noted on facial skin         [] Abnormal-            Psychiatric:       [x] Normal Affect       [] Abnormal-     Other pertinent observable physical exam findings-       Lab Results   Component Value Date     02/19/2020    K 4.2 02/19/2020     02/19/2020    CO2 21 02/19/2020    BUN 12 02/19/2020    CREATININE 1.2 02/19/2020    GLUCOSE 96 02/19/2020    CALCIUM 8.3 02/19/2020     Lab Results   Component Value Date    TRIG 65 02/19/2020    HDL 70 02/19/2020    LDLCALC 175 02/19/2020    LABVLDL 13 02/19/2020     Lab Results   Component Value Date    WBC 5.6 02/19/2020    HGB 11.1 (L) 02/19/2020    HCT 33.0 (L) 02/19/2020    .0 (H) 02/19/2020     02/19/2020     Echo 2/19/2020:  Normal LV size,wall thickness and motion. Estimated ejection fraction is 60%. The left atrium is normal in size. Normal right ventricular size and function. Mild Mitral and Tricuspid regurgitation. Trivial aortic regurgitation is present. Assessment:    1. PSVT (paroxysmal supraventricular tachycardia) (HCC)  -no recurrent episodes that she is aware of  -has not had palpitations or sxs to suggest recurrence  -need to follow up thyroid levels  -consider 30 day event monitor if thyroid has normalized  -not on BB or CCB d/t hypotension and bradycardia    2. Other specified hypothyroidism  -on thyroid replacement  -advised she needs to follow up with her PCP to monitor this long term    3.  Dyslipidemia  -LDL elevated on labs in 2/2020  -reinforced low fat diet and increase in aerobic exercise    Plan:  Check TSH and T4  Continue synthroid  Advised to follow up with PCP regarding long term treatment of her hypothyroidism  Discussed low fat/low sodium diet and reinforced regular aerobic exercise. Possible 30 day event monitor once thyroid normalizes  Follow up to be determined; will follow up lab results and then consider 30 day event monitor if needed    Return for to be determined pending lab results and possible event monitor. Thanks for allowing me to participate in the care of this patient. ADRI Sesay  [facility], [address], [address], [place] [facility] [address]  Office: [phone]  Fax: [phone]        Jackie Santillan is a 60 y.o. female being evaluated by a Virtual Visit (video visit) encounter to address concerns as mentioned above. A caregiver was present when appropriate. Due to this being a TeleHealth encounter (During QYA-72 public health emergency), evaluation of the following organ systems was limited: Vitals/Constitutional/EENT/Resp/CV/GI//MS/Neuro/Skin/Heme-Lymph-Imm. Pursuant to the emergency declaration under the [address] [place] [address], [address] [place] [address] authority and the [employer] Act, this Virtual Visit was conducted with patient's (and/or legal guardian's) consent, to reduce the patient's risk of exposure to COVID-19 and provide necessary medical care. The patient (and/or legal guardian) has also been advised to contact this office for worsening conditions or problems, and seek emergency medical treatment and/or call 911 if deemed necessary. Services were provided through a video synchronous discussion virtually to substitute for in-person clinic visit. Patient and provider were located at their individual homes. --MATILDA Pagan CNP on 5/14/2020 at 1:41 PM    An electronic signature was used to authenticate this note.

## 2020-05-14 ENCOUNTER — VIRTUAL VISIT (OUTPATIENT)
Dept: CARDIOLOGY CLINIC | Age: 65
End: 2020-05-14
Payer: COMMERCIAL

## 2020-05-14 VITALS — HEIGHT: 62 IN | BODY MASS INDEX: 27.23 KG/M2 | WEIGHT: 148 LBS

## 2020-05-14 PROCEDURE — 99213 OFFICE O/P EST LOW 20 MIN: CPT | Performed by: NURSE PRACTITIONER

## 2020-05-14 RX ORDER — LEVOTHYROXINE SODIUM 0.05 MG/1
50 TABLET ORAL DAILY
Qty: 30 TABLET | Refills: 0 | Status: SHIPPED | OUTPATIENT
Start: 2020-05-14

## 2020-06-05 DIAGNOSIS — E03.9 ACQUIRED HYPOTHYROIDISM: ICD-10-CM

## 2020-06-05 LAB — TSH SERPL DL<=0.05 MIU/L-ACNC: 23.13 UIU/ML (ref 0.27–4.2)

## 2020-06-05 PROCEDURE — 84443 ASSAY THYROID STIM HORMONE: CPT
